# Patient Record
Sex: MALE | Employment: FULL TIME | URBAN - METROPOLITAN AREA
[De-identification: names, ages, dates, MRNs, and addresses within clinical notes are randomized per-mention and may not be internally consistent; named-entity substitution may affect disease eponyms.]

---

## 2020-01-17 ENCOUNTER — THERAPY VISIT (OUTPATIENT)
Dept: PHYSICAL THERAPY | Facility: CLINIC | Age: 25
End: 2020-01-17
Payer: COMMERCIAL

## 2020-01-17 DIAGNOSIS — M25.562 LEFT KNEE PAIN: ICD-10-CM

## 2020-01-17 PROCEDURE — 97161 PT EVAL LOW COMPLEX 20 MIN: CPT | Mod: GP | Performed by: PHYSICAL THERAPIST

## 2020-01-17 PROCEDURE — 97110 THERAPEUTIC EXERCISES: CPT | Mod: GP | Performed by: PHYSICAL THERAPIST

## 2020-01-17 ASSESSMENT — ACTIVITIES OF DAILY LIVING (ADL)
SQUAT: ACTIVITY IS SOMEWHAT DIFFICULT
GIVING WAY, BUCKLING OR SHIFTING OF KNEE: I HAVE THE SYMPTOM BUT IT DOES NOT AFFECT MY ACTIVITY
HOW_WOULD_YOU_RATE_THE_OVERALL_FUNCTION_OF_YOUR_KNEE_DURING_YOUR_USUAL_DAILY_ACTIVITIES?: ABNORMAL
HOW_WOULD_YOU_RATE_THE_CURRENT_FUNCTION_OF_YOUR_KNEE_DURING_YOUR_USUAL_DAILY_ACTIVITIES_ON_A_SCALE_FROM_0_TO_100_WITH_100_BEING_YOUR_LEVEL_OF_KNEE_FUNCTION_PRIOR_TO_YOUR_INJURY_AND_0_BEING_THE_INABILITY_TO_PERFORM_ANY_OF_YOUR_USUAL_DAILY_ACTIVITIES?: 60
GO UP STAIRS: ACTIVITY IS VERY DIFFICULT
PAIN: THE SYMPTOM AFFECTS MY ACTIVITY SEVERELY
RISE FROM A CHAIR: ACTIVITY IS SOMEWHAT DIFFICULT
AS_A_RESULT_OF_YOUR_KNEE_INJURY,_HOW_WOULD_YOU_RATE_YOUR_CURRENT_LEVEL_OF_DAILY_ACTIVITY?: ABNORMAL
KNEEL ON THE FRONT OF YOUR KNEE: ACTIVITY IS VERY DIFFICULT
STIFFNESS: THE SYMPTOM AFFECTS MY ACTIVITY SEVERELY
GO DOWN STAIRS: ACTIVITY IS VERY DIFFICULT
STAND: ACTIVITY IS FAIRLY DIFFICULT
WEAKNESS: I HAVE THE SYMPTOM BUT IT DOES NOT AFFECT MY ACTIVITY
WALK: ACTIVITY IS FAIRLY DIFFICULT
SIT WITH YOUR KNEE BENT: ACTIVITY IS MINIMALLY DIFFICULT
LIMPING: THE SYMPTOM AFFECTS MY ACTIVITY SEVERELY

## 2020-01-17 NOTE — LETTER
[facility] ATHLETIC [facility] PHYSICAL THERAPY  [address] [place] [address]  [phone]    2020    Re: Navjot Armando   :   1995  MRN:  [id number]   REFERRING PHYSICIAN:   John Cazares MD    [facility] ATHLETIC [facility] PHYSICAL [facility]    Date of Initial Evaluation:  20  Visits:  Rxs Used: 1  Reason for Referral:  Left knee pain      Physical Therapy Initial Evaluation  2020     Precautions/Restrictions/MD instructions: PT eval and treat.     Subjective:   Date of Onset: 10 years ago  C/C: left medial and anterior knee pain. He reports some clicking and buckling episodes but denies locking.  Quality of pain is dull, aching and sharp. Pains are described as intermittent in nature. Pain is worse: with use. Pain is rated 6-7/10.   History of symptoms: Pains began suddenly as the result of wrestling in highschool. His foot was planted and he he twisted his knee. He felt a pop at the time and was unable to weightbear for several hours. He had another injury in 2016 when his knee buckled when reaching down to throw something away. Since onset, symptoms are waxing and waning. Previous episodes: recurrent pain for a decade  Worsened by: stairs, walking for longer distances, squatting, getting up from a chair.   Alleviated by: Ice and Ibuprofen.    General health as reported by patient: good  Pertinent medical/surgical history: headaches daily with neck pain, history of spinal fracture as an infant with improper healing - he is unsure of what level. He denies night pain, significant current illness or recent hospital admission.   Imaging: x-ray and getting an MRI.    Re: Navjot Armando   :   1995      Current occupational status: [employer] and .   Patient's goals are: decrease pain, improve tolerance to stairs, walking, squatting. Improve tolerance to upper and lower body strengthening .  Return to MD:   PRN.     Objective:  KNEE:    PROM:   L  R   Hyperextension none 0   Extension Lacking 2 from full extension 0   Flexion 130 135     Strength:   L R   HIP     Flex 5/5 5/5   Ext 4/5 4/5   ER 4+/5 5/5   Abd 3+/4 4/5   KNEE     Flex 4/5 5/5   Ext 5/5 5/5     Hip PROM:  WNL    Special tests:   L R   Anterior Drawer - -   Posterior Drawer - -   Lachman's Boggy end feel -   Valgus 0 degrees - -   Valgus 30 degrees - -   Varus 0 degrees - -   Varus 30 degrees - -   Brissa's + -   Appley's - -   Patellar Compression - -     Palpation: Tender to palpation at medial joint line and patellar tendon of left knee    Re: Navjot Armando   :   1995        Assessment/Plan:    The patient is a 24 year old male with chief complaint of left knee pain consistent with meniscal pathology and intermittent patellofemoral pain.    The patient has the following significant findings with corresponding treatment plan.  Diagnosis 1:  Left knee pain    Pain -  hot/cold therapy, manual therapy, splint/taping/bracing/orthotics, self management, education and home program  Decreased ROM/flexibility - manual therapy and therapeutic exercise  Decreased strength - therapeutic exercise and therapeutic activities  Impaired balance - neuro re-education and therapeutic activities  Decreased proprioception - neuro re-education and therapeutic activities  Impaired gait - gait training  Impaired muscle performance - neuro re-education  Decreased function - therapeutic activities    Therapy Evaluation Codes:   Cumulative Therapy Evaluation is: Low complexity.    Previous and current functional limitations:  (See Goal Flow Sheet for this information)    Short term and Long term goals: (See Goal Flow Sheet for this information)     Communication ability:  Patient appears to be able to clearly communicate and understand verbal and written communication and follow directions correctly.  Treatment Explanation - The following has been discussed with the  patient: RX ordered/plan of care, anticipated outcomes, and possible risks and side effects.  This patient would benefit from PT intervention to resume normal activities.   Rehab potential is good.    Frequency:  1 X week, once daily  Duration:  for 6 weeks  Discharge Plan: Achieve all LTGs, be independent in home treatment program, and reach maximal therapeutic benefit.    Please refer to the daily flowsheet for treatment today, total treatment time and time spent performing 1:1 timed codes.       Thank you for your referral.    INQUIRIES  Therapist: Cintia Silva DPT  [place] [facility] PH[facility]ICAL THERAP[facility]  [address] [place] [address]  Phone: [phone]  Fax: [phone]

## 2020-01-17 NOTE — PROGRESS NOTES
Physical Therapy Initial Evaluation  January 17, 2020     Precautions/Restrictions/MD instructions: PT eval and treat.     Subjective:   Date of Onset: 10 years ago  C/C: left medial and anterior knee pain. He reports some clicking and buckling episodes but denies locking.  Quality of pain is dull, aching and sharp. Pains are described as intermittent in nature. Pain is worse: with use. Pain is rated 6-7/10.   History of symptoms: Pains began suddenly as the result of wrestling in highschool. His foot was planted and he he twisted his knee. He felt a pop at the time and was unable to weightbear for several hours. He had another injury in 2016 when his knee buckled when reaching down to throw something away. Since onset, symptoms are waxing and waning. Previous episodes: recurrent pain for a decade  Worsened by: stairs, walking for longer distances, squatting, getting up from a chair.   Alleviated by: Ice and Ibuprofen.    General health as reported by patient: good  Pertinent medical/surgical history: headaches daily with neck pain, history of spinal fracture as an infant with improper healing - he is unsure of what level. He denies night pain, significant current illness or recent hospital admission.   Imaging: x-ray and getting an MRI. Current occupational status: [employer] and . Patient's goals are: decrease pain, improve tolerance to stairs, walking, squatting. Improve tolerance to upper and lower body strengthening . Return to MD:  PRN.     Objective:  KNEE:    PROM:   L  R   Hyperextension none 0   Extension Lacking 2 from full extension 0   Flexion 130 135     Strength:   L R   HIP     Flex 5/5 5/5   Ext 4/5 4/5   ER 4+/5 5/5   Abd 3+/4 4/5   KNEE     Flex 4/5 5/5   Ext 5/5 5/5     Hip PROM:  WNL    Special tests:   L R   Anterior Drawer - -   Posterior Drawer - -   Lachman's Boggy end feel -   Valgus 0 degrees - -   Valgus 30 degrees - -   Varus 0 degrees - -   Varus 30 degrees - -    Brissa's + -   Appley's - -   Patellar Compression - -       Palpation: Tender to palpation at medial joint line and patellar tendon of left knee    Assessment/Plan:    The patient is a 24 year old male with chief complaint of left knee pain consistent with meniscal pathology and intermittent patellofemoral pain.    The patient has the following significant findings with corresponding treatment plan.  Diagnosis 1:  Left knee pain    Pain -  hot/cold therapy, manual therapy, splint/taping/bracing/orthotics, self management, education and home program  Decreased ROM/flexibility - manual therapy and therapeutic exercise  Decreased strength - therapeutic exercise and therapeutic activities  Impaired balance - neuro re-education and therapeutic activities  Decreased proprioception - neuro re-education and therapeutic activities  Impaired gait - gait training  Impaired muscle performance - neuro re-education  Decreased function - therapeutic activities    Therapy Evaluation Codes:   Cumulative Therapy Evaluation is: Low complexity.    Previous and current functional limitations:  (See Goal Flow Sheet for this information)    Short term and Long term goals: (See Goal Flow Sheet for this information)     Communication ability:  Patient appears to be able to clearly communicate and understand verbal and written communication and follow directions correctly.  Treatment Explanation - The following has been discussed with the patient: RX ordered/plan of care, anticipated outcomes, and possible risks and side effects.  This patient would benefit from PT intervention to resume normal activities.   Rehab potential is good.    Frequency:  1 X week, once daily  Duration:  for 6 weeks  Discharge Plan: Achieve all LTGs, be independent in home treatment program, and reach maximal therapeutic benefit.    Please refer to the daily flowsheet for treatment today, total treatment time and time spent performing 1:1 timed codes.

## 2020-01-19 PROBLEM — M25.562 LEFT KNEE PAIN: Status: ACTIVE | Noted: 2020-01-19

## 2020-03-26 PROBLEM — M25.562 LEFT KNEE PAIN: Status: RESOLVED | Noted: 2020-01-19 | Resolved: 2020-03-26

## 2021-04-25 ENCOUNTER — HEALTH MAINTENANCE LETTER (OUTPATIENT)
Age: 26
End: 2021-04-25

## 2021-10-10 ENCOUNTER — HEALTH MAINTENANCE LETTER (OUTPATIENT)
Age: 26
End: 2021-10-10

## 2022-05-21 ENCOUNTER — HEALTH MAINTENANCE LETTER (OUTPATIENT)
Age: 27
End: 2022-05-21

## 2022-09-18 ENCOUNTER — HEALTH MAINTENANCE LETTER (OUTPATIENT)
Age: 27
End: 2022-09-18

## 2023-06-04 ENCOUNTER — HEALTH MAINTENANCE LETTER (OUTPATIENT)
Age: 28
End: 2023-06-04